# Patient Record
Sex: FEMALE | Race: WHITE
[De-identification: names, ages, dates, MRNs, and addresses within clinical notes are randomized per-mention and may not be internally consistent; named-entity substitution may affect disease eponyms.]

---

## 2018-12-13 ENCOUNTER — HOSPITAL (OUTPATIENT)
Dept: OTHER | Age: 43
End: 2018-12-13
Attending: OBSTETRICS & GYNECOLOGY

## 2019-01-31 ENCOUNTER — HOSPITAL (OUTPATIENT)
Dept: OTHER | Age: 44
End: 2019-01-31

## 2019-02-28 ENCOUNTER — HOSPITAL (OUTPATIENT)
Dept: OTHER | Age: 44
End: 2019-02-28

## 2019-03-14 ENCOUNTER — HOSPITAL (OUTPATIENT)
Dept: OTHER | Age: 44
End: 2019-03-14

## 2019-04-04 ENCOUNTER — HOSPITAL (OUTPATIENT)
Dept: OTHER | Age: 44
End: 2019-04-04

## 2019-05-03 ENCOUNTER — HOSPITAL (OUTPATIENT)
Dept: OTHER | Age: 44
End: 2019-05-03

## 2019-05-31 ENCOUNTER — HOSPITAL (OUTPATIENT)
Dept: OTHER | Age: 44
End: 2019-05-31

## 2019-05-31 PROCEDURE — 76816 OB US FOLLOW-UP PER FETUS: CPT | Performed by: OBSTETRICS & GYNECOLOGY

## 2019-05-31 PROCEDURE — 99202 OFFICE O/P NEW SF 15 MIN: CPT | Performed by: OBSTETRICS & GYNECOLOGY

## 2019-05-31 PROCEDURE — 76819 FETAL BIOPHYS PROFIL W/O NST: CPT | Performed by: OBSTETRICS & GYNECOLOGY

## 2019-06-03 ENCOUNTER — HOSPITAL (OUTPATIENT)
Dept: OTHER | Age: 44
End: 2019-06-03
Attending: OBSTETRICS & GYNECOLOGY

## 2019-06-06 ENCOUNTER — HOSPITAL (OUTPATIENT)
Dept: OTHER | Age: 44
End: 2019-06-06
Attending: OBSTETRICS & GYNECOLOGY

## 2019-06-10 ENCOUNTER — HOSPITAL (OUTPATIENT)
Dept: OTHER | Age: 44
End: 2019-06-10
Attending: OBSTETRICS & GYNECOLOGY

## 2019-06-13 ENCOUNTER — HOSPITAL (OUTPATIENT)
Dept: OTHER | Age: 44
End: 2019-06-13
Attending: OBSTETRICS & GYNECOLOGY

## 2019-06-17 ENCOUNTER — HOSPITAL (OUTPATIENT)
Dept: OTHER | Age: 44
End: 2019-06-17

## 2019-06-20 ENCOUNTER — HOSPITAL (OUTPATIENT)
Dept: OTHER | Age: 44
End: 2019-06-20

## 2019-06-24 ENCOUNTER — HOSPITAL (OUTPATIENT)
Dept: OTHER | Age: 44
End: 2019-06-24

## 2019-06-27 ENCOUNTER — HOSPITAL (OUTPATIENT)
Dept: OTHER | Age: 44
End: 2019-06-27

## 2019-06-27 ENCOUNTER — HOSPITAL (OUTPATIENT)
Dept: OTHER | Age: 44
End: 2019-06-27
Attending: OBSTETRICS & GYNECOLOGY

## 2019-06-27 PROCEDURE — 99214 OFFICE O/P EST MOD 30 MIN: CPT | Performed by: OBSTETRICS & GYNECOLOGY

## 2019-06-27 PROCEDURE — 76819 FETAL BIOPHYS PROFIL W/O NST: CPT | Performed by: OBSTETRICS & GYNECOLOGY

## 2019-06-27 PROCEDURE — 76816 OB US FOLLOW-UP PER FETUS: CPT | Performed by: OBSTETRICS & GYNECOLOGY

## 2019-07-01 ENCOUNTER — HOSPITAL (OUTPATIENT)
Dept: OTHER | Age: 44
End: 2019-07-01
Attending: OBSTETRICS & GYNECOLOGY

## 2019-07-04 ENCOUNTER — HOSPITAL (OUTPATIENT)
Dept: OTHER | Age: 44
End: 2019-07-04
Attending: OBSTETRICS & GYNECOLOGY

## 2019-07-05 ENCOUNTER — HOSPITAL (OUTPATIENT)
Dept: OTHER | Age: 44
End: 2019-07-05
Attending: OBSTETRICS & GYNECOLOGY

## 2019-07-05 LAB
UA APPEAR: CLEAR
UA BACTERIA: ABNORMAL
UA BILI: NEGATIVE
UA BLOOD: ABNORMAL
UA COLOR: YELLOW
UA EPITHELIAL: ABNORMAL
UA GLUCOSE: NEGATIVE
UA KETONES: NEGATIVE
UA LEUK EST: ABNORMAL
UA NITRITE: NEGATIVE
UA PH: 6 (ref 5–7)
UA PROTEIN: NEGATIVE
UA RBC: ABNORMAL
UA SPEC GRAV: <=1.005 (ref 1.01–1.02)
UA UROBILINOGEN: 0.2 MG/DL (ref 0.2–1)
UA WBC: ABNORMAL
UA YEAST: ABNORMAL

## 2019-07-06 ENCOUNTER — HOSPITAL (OUTPATIENT)
Dept: OTHER | Age: 44
End: 2019-07-06
Attending: OBSTETRICS & GYNECOLOGY

## 2019-07-06 LAB
ABG GLU: 78 MG/DL (ref 65–95)
ABG HCT: 44 % (ref 37–50)
ABG ION CALCIUM: 5.6 MG/DL (ref 4.5–5.3)
ABG K: 5.34 MMOL/L (ref 3.5–5.3)
ABG NA: 134.8 MMOL/L (ref 135–145)
ABS LYMPH: 2.4 K/CUMM (ref 1–3.5)
ABS MONO: 0.5 K/CUMM (ref 0.1–0.8)
ABS NEUTRO: 5.4 K/CUMM (ref 2–8)
ALLEN'S TEST: ABNORMAL
ANALYZER: ABNORMAL
BASOPHIL: 0 % (ref 0–1)
BEVT: -2.2 MMOL/L (ref -8–0)
COHB: 1.6 % (ref 0.5–1.5)
DIFF_TYPE?: NORMAL
DRAW SITE: ABNORMAL
EOSINOPHIL: 0 % (ref 0–6)
HCO3: 22.2 MMOL/L (ref 18–26)
HCT VFR BLD CALC: 37 % (ref 33–45)
HGB BLD-MCNC: 12.8 G/DL (ref 11–15)
IMMATURE GRAN: 0.6 % (ref 0–0.3)
INSTR WBC: 8.5 K/CUMM (ref 4–11)
LYMPHOCYTE: 28 %
MCH RBC QN AUTO: 31 PG (ref 25–35)
MCHC RBC AUTO-ENTMCNC: 34 G/DL (ref 32–37)
MCV RBC AUTO: 90 FL (ref 78–97)
METHB: 0.8 % (ref 0–1.5)
MONOCYTE: 6 %
NEUTROPHIL: 64 %
NRBC BLD MANUAL-RTO: 0 % (ref 0–0.2)
O2 SAT(EST): 82.9 %
O2HB: 80.9 %
PCO2: 37.2 MMHG (ref 42–50)
PH: 7.39 (ref 7.2–7.35)
PLATELET: 182 K/CUMM (ref 150–450)
PO2: 36.6 MMHG
RBC # BLD: 4.12 M/CUMM (ref 3.7–5.2)
RDW: 14.1 % (ref 11.5–14.5)
SAMPLE TYPE: ABNORMAL
SYPHILIS INSTR: 0.03
SYPHILIS: NON REACTIVE
TECH ID: 5668
THB: 15 G/DL (ref 12–18)
UA APPEAR: CLEAR
UA BACTERIA: ABNORMAL
UA BILI: NEGATIVE
UA BLOOD: NEGATIVE
UA COLOR: YELLOW
UA EPITHELIAL: ABNORMAL
UA GLUCOSE: NEGATIVE
UA KETONES: NEGATIVE
UA LEUK EST: ABNORMAL
UA NITRITE: NEGATIVE
UA PH: 6.5 (ref 5–7)
UA PROTEIN: NEGATIVE
UA RBC: ABNORMAL
UA SPEC GRAV: <=1.005 (ref 1.01–1.02)
UA UROBILINOGEN: 0.2 MG/DL (ref 0.2–1)
UA WBC: ABNORMAL
WBC # BLD: 8.5 K/CUMM (ref 4–11)

## 2019-07-07 LAB
ABS NEUTRO: 7.8 K/CUMM (ref 2–8)
HCT VFR BLD CALC: 32 % (ref 33–45)
HGB BLD-MCNC: 10.9 G/DL (ref 11–15)
INSTR WBC: 10.8 K/CUMM (ref 4–11)
MCH RBC QN AUTO: 31 PG (ref 25–35)
MCHC RBC AUTO-ENTMCNC: 34 G/DL (ref 32–37)
MCV RBC AUTO: 90 FL (ref 78–97)
NRBC BLD MANUAL-RTO: 0 % (ref 0–0.2)
PLATELET: 177 K/CUMM (ref 150–450)
RBC # BLD: 3.49 M/CUMM (ref 3.7–5.2)
RDW: 13.6 % (ref 11.5–14.5)
WBC # BLD: 10.8 K/CUMM (ref 4–11)

## 2020-07-17 ENCOUNTER — HOSPITAL (OUTPATIENT)
Dept: OTHER | Age: 45
End: 2020-07-17
Attending: PSYCHIATRY & NEUROLOGY

## 2020-08-10 ENCOUNTER — HOSPITAL (OUTPATIENT)
Dept: OTHER | Age: 45
End: 2020-08-10
Attending: FAMILY MEDICINE

## 2020-08-14 ENCOUNTER — HOSPITAL (OUTPATIENT)
Dept: OTHER | Age: 45
End: 2020-08-14
Attending: PSYCHIATRY & NEUROLOGY

## 2020-09-04 ENCOUNTER — HOSPITAL (OUTPATIENT)
Dept: OTHER | Age: 45
End: 2020-09-04
Attending: FAMILY MEDICINE

## 2020-10-02 ENCOUNTER — HOSPITAL (OUTPATIENT)
Dept: OTHER | Age: 45
End: 2020-10-02
Attending: FAMILY MEDICINE

## 2022-05-11 ENCOUNTER — APPOINTMENT (OUTPATIENT)
Dept: URBAN - METROPOLITAN AREA CLINIC 246 | Age: 47
Setting detail: DERMATOLOGY
End: 2022-05-12

## 2022-05-11 DIAGNOSIS — L81.1 CHLOASMA: ICD-10-CM

## 2022-05-11 PROCEDURE — OTHER COUNSELING: OTHER

## 2022-05-11 PROCEDURE — 99203 OFFICE O/P NEW LOW 30 MIN: CPT

## 2022-05-11 PROCEDURE — OTHER PRESCRIPTION MEDICATION MANAGEMENT: OTHER

## 2022-05-11 PROCEDURE — OTHER PRESCRIPTION: OTHER

## 2022-05-11 RX ORDER — FLUOCINOLONE ACETONIDE, HYDROQUINONE, AND TRETINOIN .1; 40; .5 MG/G; MG/G; MG/G
CREAM TOPICAL QHS
Qty: 30 | Refills: 2 | Status: ERX | COMMUNITY
Start: 2022-05-11

## 2022-05-11 RX ORDER — HYDROQUINONE 20 MG/G
CREAM TOPICAL QHS
Qty: 30 | Refills: 1 | Status: CANCELLED

## 2022-05-11 ASSESSMENT — LOCATION DETAILED DESCRIPTION DERM
LOCATION DETAILED: LEFT MEDIAL INFERIOR EYELID
LOCATION DETAILED: RIGHT NASAL ALA
LOCATION DETAILED: LEFT CARUNCULA

## 2022-05-11 ASSESSMENT — LOCATION ZONE DERM
LOCATION ZONE: EYELID
LOCATION ZONE: NOSE

## 2022-05-11 ASSESSMENT — LOCATION SIMPLE DESCRIPTION DERM
LOCATION SIMPLE: LEFT EYE
LOCATION SIMPLE: RIGHT NOSE
LOCATION SIMPLE: LEFT INFERIOR EYELID

## 2022-05-11 NOTE — PROCEDURE: PRESCRIPTION MEDICATION MANAGEMENT
Detail Level: Zone
Render In Strict Bullet Format?: No
Initiate Treatment: Rx was sent in error for Hydroquinone, per provider cancelled at pharmacy \\n\\nPatient to start: \\nTri-Shira 0.01 %-4 %-0.05 % topical cream QHS\\nQuantity: 30.0 g\\nSig: Apply at bedtime 3 x weekly to discolored skin x 8 weeks. Avoid sunlight.

## 2022-05-11 NOTE — HPI: DISCOLORATION (MELASMA)
How Severe Are They?: moderate
Is This A New Presentation, Or A Follow-Up?: Discoloration
Additional History: Patient reports using creams OTC and hydroquinone but has had side effects, rashes due to very sensitive skin.

## 2022-05-11 NOTE — PROCEDURE: COUNSELING
Patient Specific Counseling (Will Not Stick From Patient To Patient): Refer to Lucila novak
Detail Level: Simple

## 2022-05-12 ENCOUNTER — RX ONLY (RX ONLY)
Age: 47
End: 2022-05-12

## 2022-05-12 RX ORDER — HYDROQUINONE 4 %
CREAM (GRAM) TOPICAL
Qty: 30 | Refills: 0 | Status: ERX | COMMUNITY
Start: 2022-05-12